# Patient Record
Sex: MALE | Race: WHITE | NOT HISPANIC OR LATINO | Employment: OTHER | URBAN - METROPOLITAN AREA
[De-identification: names, ages, dates, MRNs, and addresses within clinical notes are randomized per-mention and may not be internally consistent; named-entity substitution may affect disease eponyms.]

---

## 2018-09-05 ENCOUNTER — TELEPHONE (OUTPATIENT)
Dept: NEUROSURGERY | Facility: CLINIC | Age: 68
End: 2018-09-05

## 2018-09-05 NOTE — TELEPHONE ENCOUNTER
Spoke with patient to schedule consult.  Date time and location verbalized.  MRs are being obtained by Dr. Mendoza's office and scanned into media.

## 2018-09-13 ENCOUNTER — INITIAL CONSULT (OUTPATIENT)
Dept: HEMATOLOGY/ONCOLOGY | Facility: CLINIC | Age: 68
End: 2018-09-13
Payer: MEDICARE

## 2018-09-13 VITALS
DIASTOLIC BLOOD PRESSURE: 86 MMHG | TEMPERATURE: 98 F | HEIGHT: 68 IN | RESPIRATION RATE: 18 BRPM | HEART RATE: 59 BPM | SYSTOLIC BLOOD PRESSURE: 155 MMHG | BODY MASS INDEX: 35.02 KG/M2 | WEIGHT: 231.06 LBS

## 2018-09-13 DIAGNOSIS — E78.5 DYSLIPIDEMIA: ICD-10-CM

## 2018-09-13 DIAGNOSIS — I15.9 SECONDARY HYPERTENSION: ICD-10-CM

## 2018-09-13 DIAGNOSIS — C61 PROSTATE CA: ICD-10-CM

## 2018-09-13 DIAGNOSIS — C78.00 MALIGNANT NEOPLASM METASTATIC TO LUNG, UNSPECIFIED LATERALITY: ICD-10-CM

## 2018-09-13 PROCEDURE — 99205 OFFICE O/P NEW HI 60 MIN: CPT | Mod: S$PBB,,, | Performed by: INTERNAL MEDICINE

## 2018-09-13 PROCEDURE — 99999 PR PBB SHADOW E&M-EST. PATIENT-LVL III: CPT | Mod: PBBFAC,,, | Performed by: INTERNAL MEDICINE

## 2018-09-13 PROCEDURE — 99213 OFFICE O/P EST LOW 20 MIN: CPT | Mod: PBBFAC,PN | Performed by: INTERNAL MEDICINE

## 2018-09-13 RX ORDER — LOSARTAN POTASSIUM AND HYDROCHLOROTHIAZIDE 25; 100 MG/1; MG/1
1 TABLET ORAL
COMMUNITY
Start: 2017-12-27 | End: 2018-12-27

## 2018-09-13 RX ORDER — LANOLIN ALCOHOL/MO/W.PET/CERES
1000 CREAM (GRAM) TOPICAL
COMMUNITY
Start: 2012-04-19

## 2018-09-13 RX ORDER — ROSUVASTATIN CALCIUM 20 MG/1
10 TABLET, COATED ORAL
COMMUNITY
Start: 2018-05-04

## 2018-09-13 RX ORDER — ERGOCALCIFEROL 1.25 MG/1
50000 CAPSULE ORAL
COMMUNITY
Start: 2018-05-04

## 2018-09-13 NOTE — Clinical Note
September 17, 2018      Effie Loredo PA-C  [address] [facility] [address]           [facility] [place] [id number] [facility] [address]  [facility] [address]  Phone: [phone]  Fax: [phone]          Patient: Franco Randolph   MR Number: [id number]   YOB: 1950   Date of Visit: 9/13/2018       Dear Effie Loredo:    Thank you for referring Franco Randolph to me for evaluation. Attached you will find relevant portions of my assessment and plan of care.    If you have questions, please do not hesitate to call me. I look forward to following Franco Randolph along with you.    Sincerely,    Michelle Means MD    Enclosure  CC:  No Recipients    If you would like to receive this communication electronically, please contact [email or web address] or [phone] to request more information on "[employer] Link access.    For providers and/or their staff who would like to refer a patient to [facility], please contact us through our one-stop-shop provider referral line, [facility], at [phone].    If you feel you have received this communication in error or would no longer like to receive these types of communications, please e-mail [email or web address]

## 2018-09-17 PROBLEM — C61 PROSTATE CA: Status: ACTIVE | Noted: 2018-09-17

## 2018-09-17 PROBLEM — C78.00 LUNG METASTASES: Status: ACTIVE | Noted: 2018-09-17

## 2018-09-17 PROBLEM — E78.5 DYSLIPIDEMIA: Status: ACTIVE | Noted: 2018-09-17

## 2018-09-17 PROBLEM — I15.9 SECONDARY HYPERTENSION: Status: ACTIVE | Noted: 2018-09-17

## 2018-09-17 NOTE — PROGRESS NOTES
HISTORY OF PRESENT ILLNESS:  A 68-year-old  male here with his wife.    The patient is seeking second opinion for planned treatment for prostate cancer   that has been metastatic to lung.  The patient has been seeing Ambrosio Obrien at   the [facility].  He also has met with Radiation Oncology, Georges Means as well as having Medical Oncology following him.  The patient's history   includes a diagnosis of adenocarcinoma of the prostate, status post radical   prostatectomy in 01/2011.  He was found to have T3b, N1 disease at that time.    Four lymph nodes on the right were positive.  This was followed with external   beam radiation and 2 years of androgen deprivation therapy.  The patient's PSA   was rising.  He had an Axumin scan most recently and he saw radiation oncologist   consult as well.  The patient has been continuing androgen deprivation therapy   now along with calcium as he is still considered castrate sensitive.  Other   infusions have been discussed including Taxotere, Xtandi, Zytiga.  A PET-CT had   shown 2.4 x 2.1 cm mass posterior inferior right upper lobe abutting the major   and minor fissure with a maximum SUV of 3, with at least 3 hilar nodes, largest   measuring 2.6 x 2.3 cm on the right mediastinal lymphadenopathy.  A CT-guided   biopsy of the right lung lesion was done on 08/21/2018 which revealed   adenocarcinoma consistent with metastatic prostate carcinoma.  Most recent plans   on this patient has been, possibly start of Provenge and Taxotere.  There is a   three-month followup planned with a CT of the chest and the PSA, and he has   received an Eligard six-month injection on 09/04/2018.  The second opinion is to   inquire about the treatment plan and seeking further validation of the specific   plan.    REVIEW OF SYSTEMS:  CONSTITUTIONAL:  Good appetite. No fever, night sweats, headaches, fatigue,   dizziness, or weakness.  SKIN:  Denies rash, bleeding, blotching skin  or abnormal bruising.  EYES:  Denies eye pain, blurred vision, swelling, redness or discharge.  ENT AND MOUTH:  Denies runny nose, stuffiness, sinus trouble or sores.  Denies   nosebleeds.  Denies hoarseness or change in voice.  CARDIOVASCULAR:  Denies chest pain, discomfort, or palpitations.  RESPIRATORY:  Denies sputum production, blood in sputum, and denies shortness of   breath.  The patient has significant coughing, which has been discussed with   his treating physicians and cough syrups have been prescribed.  GI:  Denies trouble swallowing, indigestion, heartburn, abdominal pain, nausea,   vomiting, diarrhea, altered bowel habits, blood in stool, discoloration of   stools, change in nature of stool, bloating, increased abdominal girth.  GENITOURINARY:  No discharge.  No pelvic pain or lumps.  No rash around groin or   lesions.  No urinary frequency, hesitation, painful urination or blood in   urine.  Denies incontinence.  No problems with intercourse.  MUSCULOSKELETAL AND NEURO:  Denies neck or back pain.  Denies weakness in arms   or legs.  Denies tingling, numbness.    PHYSICAL EXAMINATION:  VITAL SIGNS:  His vitals have been documented.  GENERAL:  Comfortable looking patient.  Patient is in no distress.  Awake, alert   and oriented to time, person and place.  No anxiety, or agitation.  HEENT:  Normal conjunctivae and eyelids.  WNL.  PERRLA 3 to 4 mm.  No icterus,   no pallor, no congestion, and no discharge noted.  NECK:   Supple.  Trachea is central.  No crepitus.  No JVD or masses.  RESPIRATORY:  No intercostal retractions.  No dullness to percussion.  Chest is   clear to auscultation.  No rales, rhonchi or wheezes.  No crepitus.  Good air   entry bilaterally.  CARDIOVASCULAR:  S1 and S2 are normally heard without murmurs or gallops.  All   peripheral pulses are present.  ABDOMEN:  Normal abdomen.  No hepatosplenomegaly.  No free fluid.  Bowel sounds   are present.  No hernia noted. No masses.  No rebound  or tenderness.  No   guarding or rigidity.  Umbilicus is midline.  LYMPHATICS:  No axillary, cervical, supraclavicular, submental, or inguinal   lymphadenopathy.  SKIN AND MUSCULOSKELETAL:  There is no evidence of excoriation marks or   ecchymosis.  No rashes.  No cyanosis.  No clubbing.  No joint or skeletal   deformities noted.  Normal range of motion.  NEUROLOGIC:  Higher functions are appropriate.  No cranial nerve deficits.    Normal gait.  Normal strength.  Motor and sensory functions are normal.  Deep   tendon reflexes are normal.  GENITAL AND RECTAL:  Exams are deferred.    LABS:  There were no lab results done here, but all of his labs from his   treating oncologist/urology/radiation oncologist available to me through Care   Everywhere.  Today, the discussion has been lengthy.    IMPRESSION:  Metastatic prostate cancer to lung, proven by biopsy to be from   prostate origin.  The patient has received Eligard so far for.  Currently, the   patient is being treated as castrate sensitive.  There is no indication of   castrate resistance.  All the other options that have been discussed with the   patient including Provenge, Taxotere, abiraterone/Xtandi, all excellent option,   especially in case castrate-resistant is noted.  At this point, their followup   also seems appropriate with a three-month followup of PSA and scans.  I have   reassured the patient that this is a standard of care that has been recommended   to him and no further changes will be offered by us in this system.  The patient   is satisfied with the visit.  We will see him back p.r.n.      SSS/HN  dd: 09/17/2018 13:12:24 (CDT)  td: 09/18/2018 04:18:40 (CDT)  Doc ID   #[id number]  Job ID #[id number]    CC:

## 2020-04-07 NOTE — TELEPHONE ENCOUNTER
Can you please schedule this pt for an urgent 2nd opinion appointment with Dr. Coleman or Dr. Means regarding a newly diagnosed lung tumor identified in biopsy to be a prostate metastasis. Pt is family friend of Dr. Mendoza. Please let me know soon. Thank you!   Eye Protection Verbiage: Before proceeding with the stage, a plastic scleral shield was inserted. The globe was anesthetized with a few drops of 1% lidocaine with 1:100,000 epinephrine. Then, an appropriate sized scleral shield was chosen and coated with lacrilube ointment. The shield was gently inserted and left in place for the duration of each stage. After the stage was completed, the shield was gently removed.